# Patient Record
Sex: MALE | Race: BLACK OR AFRICAN AMERICAN | NOT HISPANIC OR LATINO | URBAN - METROPOLITAN AREA
[De-identification: names, ages, dates, MRNs, and addresses within clinical notes are randomized per-mention and may not be internally consistent; named-entity substitution may affect disease eponyms.]

---

## 2019-01-25 ENCOUNTER — APPOINTMENT (EMERGENCY)
Dept: RADIOLOGY | Facility: HOSPITAL | Age: 49
End: 2019-01-25
Payer: COMMERCIAL

## 2019-01-25 ENCOUNTER — HOSPITAL ENCOUNTER (EMERGENCY)
Facility: HOSPITAL | Age: 49
Discharge: HOME | End: 2019-01-25
Attending: EMERGENCY MEDICINE
Payer: COMMERCIAL

## 2019-01-25 VITALS
HEART RATE: 84 BPM | OXYGEN SATURATION: 99 % | SYSTOLIC BLOOD PRESSURE: 148 MMHG | DIASTOLIC BLOOD PRESSURE: 80 MMHG | WEIGHT: 290 LBS | BODY MASS INDEX: 40.6 KG/M2 | TEMPERATURE: 98.6 F | HEIGHT: 71 IN | RESPIRATION RATE: 16 BRPM

## 2019-01-25 DIAGNOSIS — J06.9 UPPER RESPIRATORY TRACT INFECTION, UNSPECIFIED TYPE: Primary | ICD-10-CM

## 2019-01-25 DIAGNOSIS — J11.1 INFLUENZA: ICD-10-CM

## 2019-01-25 LAB
ALBUMIN SERPL-MCNC: 2.8 G/DL (ref 3.4–5)
ALP SERPL-CCNC: 68 IU/L (ref 35–126)
ALT SERPL-CCNC: 22 IU/L (ref 16–63)
ANION GAP SERPL CALC-SCNC: 7 MEQ/L (ref 3–15)
AST SERPL-CCNC: 24 IU/L (ref 15–41)
BASOPHILS # BLD: 0 K/UL (ref 0.01–0.1)
BASOPHILS NFR BLD: 0 %
BILIRUB SERPL-MCNC: 0.2 MG/DL (ref 0.3–1.2)
BUN SERPL-MCNC: 14 MG/DL (ref 8–20)
CALCIUM SERPL-MCNC: 8.3 MG/DL (ref 8.9–10.3)
CHLORIDE SERPL-SCNC: 103 MEQ/L (ref 98–109)
CO2 SERPL-SCNC: 28 MEQ/L (ref 22–32)
CREAT SERPL-MCNC: 1.5 MG/DL
DIFFERENTIAL METHOD BLD: ABNORMAL
EOSINOPHIL # BLD: 0 K/UL (ref 0.04–0.54)
EOSINOPHIL NFR BLD: 0 %
ERYTHROCYTE [DISTWIDTH] IN BLOOD BY AUTOMATED COUNT: 14.4 % (ref 11.6–14.4)
FLUAV RNA SPEC QL NAA+PROBE: NEGATIVE
FLUBV RNA SPEC QL NAA+PROBE: NEGATIVE
GFR SERPL CREATININE-BSD FRML MDRD: >60 ML/MIN/1.73M*2
GLUCOSE BLD-MCNC: 149 MG/DL (ref 70–99)
GLUCOSE SERPL-MCNC: 100 MG/DL (ref 70–99)
HCT VFR BLDCO AUTO: 41.9 %
HGB BLD-MCNC: 13.3 G/DL
HYPOCHROMIA BLD QL SMEAR: ABNORMAL
LYMPHOCYTES # BLD: 1.28 K/UL (ref 1.2–3.5)
LYMPHOCYTES NFR BLD: 18 %
MCH RBC QN AUTO: 25.1 PG (ref 28–33.2)
MCHC RBC AUTO-ENTMCNC: 31.7 G/DL (ref 32.2–36.5)
MCV RBC AUTO: 79.2 FL (ref 83–98)
MONOCYTES # BLD: 1.21 K/UL (ref 0.3–1)
MONOCYTES NFR BLD: 17 %
NEUTS BAND # BLD: 0.07 K/UL
NEUTS BAND # BLD: 4.54 K/UL (ref 1.7–7)
NEUTS BAND NFR BLD: 1 %
NEUTS SEG NFR BLD: 64 %
PDW BLD AUTO: 9.6 FL (ref 9.4–12.4)
PLAT MORPH BLD: NORMAL
PLATELET # BLD AUTO: 348 K/UL
PLATELET # BLD EST: ABNORMAL 10*3/UL
POCT TEST: ABNORMAL
POTASSIUM SERPL-SCNC: 3.8 MEQ/L (ref 3.6–5.1)
PROT SERPL-MCNC: 5.8 G/DL (ref 6–8.2)
RBC # BLD AUTO: 5.29 M/UL (ref 4.5–5.8)
SODIUM SERPL-SCNC: 138 MEQ/L (ref 136–144)
WBC # BLD AUTO: 7.1 K/UL

## 2019-01-25 PROCEDURE — 87502 INFLUENZA DNA AMP PROBE: CPT | Performed by: PHYSICIAN ASSISTANT

## 2019-01-25 PROCEDURE — 80053 COMPREHEN METABOLIC PANEL: CPT | Performed by: EMERGENCY MEDICINE

## 2019-01-25 PROCEDURE — 85025 COMPLETE CBC W/AUTO DIFF WBC: CPT

## 2019-01-25 PROCEDURE — 99284 EMERGENCY DEPT VISIT MOD MDM: CPT | Mod: 25

## 2019-01-25 PROCEDURE — 63600000 HC DRUGS/DETAIL CODE: Performed by: PHYSICIAN ASSISTANT

## 2019-01-25 PROCEDURE — 71046 X-RAY EXAM CHEST 2 VIEWS: CPT

## 2019-01-25 PROCEDURE — 85025 COMPLETE CBC W/AUTO DIFF WBC: CPT | Performed by: EMERGENCY MEDICINE

## 2019-01-25 PROCEDURE — 36415 COLL VENOUS BLD VENIPUNCTURE: CPT

## 2019-01-25 PROCEDURE — 96374 THER/PROPH/DIAG INJ IV PUSH: CPT | Mod: 59

## 2019-01-25 PROCEDURE — 3E0333Z INTRODUCTION OF ANTI-INFLAMMATORY INTO PERIPHERAL VEIN, PERCUTANEOUS APPROACH: ICD-10-PCS | Performed by: EMERGENCY MEDICINE

## 2019-01-25 RX ORDER — LOSARTAN POTASSIUM AND HYDROCHLOROTHIAZIDE 25; 100 MG/1; MG/1
TABLET ORAL
COMMUNITY
Start: 2018-04-03

## 2019-01-25 RX ORDER — GABAPENTIN 600 MG/1
600 TABLET ORAL EVERY OTHER DAY
COMMUNITY
Start: 2018-03-28

## 2019-01-25 RX ORDER — ASPIRIN 81 MG/1
TABLET ORAL
COMMUNITY
Start: 2018-04-03

## 2019-01-25 RX ORDER — ALBUTEROL SULFATE 90 UG/1
INHALANT RESPIRATORY (INHALATION)
COMMUNITY
Start: 2018-03-28

## 2019-01-25 RX ORDER — SIMVASTATIN 20 MG/1
TABLET, FILM COATED ORAL
COMMUNITY
Start: 2018-04-03

## 2019-01-25 RX ORDER — PIOGLITAZONEHYDROCHLORIDE 30 MG/1
TABLET ORAL
COMMUNITY
Start: 2018-04-07

## 2019-01-25 RX ORDER — INSULIN LISPRO 100 [IU]/ML
INJECTION, SOLUTION INTRAVENOUS; SUBCUTANEOUS
COMMUNITY
Start: 2018-04-11

## 2019-01-25 RX ORDER — OSELTAMIVIR PHOSPHATE 75 MG/1
75 CAPSULE ORAL 2 TIMES DAILY
Qty: 10 CAPSULE | Refills: 0 | Status: SHIPPED | OUTPATIENT
Start: 2019-01-25 | End: 2019-01-30

## 2019-01-25 RX ORDER — ACETAMINOPHEN 500 MG
1000 TABLET ORAL EVERY 6 HOURS PRN
Qty: 50 TABLET | Refills: 0 | Status: SHIPPED | OUTPATIENT
Start: 2019-01-25

## 2019-01-25 RX ORDER — KETOROLAC TROMETHAMINE 30 MG/ML
30 INJECTION, SOLUTION INTRAMUSCULAR; INTRAVENOUS ONCE
Status: COMPLETED | OUTPATIENT
Start: 2019-01-25 | End: 2019-01-25

## 2019-01-25 RX ORDER — AMLODIPINE BESYLATE 5 MG/1
TABLET ORAL
COMMUNITY
Start: 2018-03-28

## 2019-01-25 RX ORDER — MULTIVITAMIN
TABLET ORAL
COMMUNITY
Start: 2018-04-03

## 2019-01-25 RX ORDER — IBUPROFEN 600 MG/1
600 TABLET ORAL EVERY 6 HOURS PRN
Qty: 25 TABLET | Refills: 0 | Status: SHIPPED | OUTPATIENT
Start: 2019-01-25 | End: 2019-02-04

## 2019-01-25 RX ORDER — MONTELUKAST SODIUM 10 MG/1
TABLET ORAL
COMMUNITY
Start: 2018-04-03

## 2019-01-25 RX ADMIN — KETOROLAC TROMETHAMINE 30 MG: 30 INJECTION, SOLUTION INTRAMUSCULAR at 16:44

## 2019-01-25 ASSESSMENT — ENCOUNTER SYMPTOMS
DIAPHORESIS: 1
COUGH: 1
ABDOMINAL DISTENTION: 0
CHILLS: 1
CHEST TIGHTNESS: 1
MYALGIAS: 1
SORE THROAT: 1
HEADACHES: 0
DYSURIA: 0
FEVER: 1
NAUSEA: 1
VOMITING: 0
DIARRHEA: 1
RHINORRHEA: 0

## 2019-01-25 NOTE — ED PROVIDER NOTES
HPI     Chief Complaint   Patient presents with   • Nasal Congestion     cough, congestion, asthma exacerbation for over one month       This 49 y/o M presents w/ URI symptoms x1 month. Associated with nasal congestion, productive cough, sore throat, body aches, myalgias, chest tightness, subjective fever. Pt states he took off from work at the onset of his symptoms which helped initially, but it has remained unchanged since. Pt has had some relief with dayquil, nyquil, hot tea, vicks nasal spray. Pt admits to diarrhea off and no for the past month. Pt admits to receiving flu vaccination. Pt works at a [employer] as a .         URI   Presenting symptoms: congestion, cough, fever and sore throat    Presenting symptoms: no rhinorrhea    Presenting symptoms comment:  Subjective fever.  Congestion:     Location:  Nasal and chest  Cough:     Cough characteristics: Productive with movement.    Severity:  Mild    Onset quality:  Sudden    Duration:  1 month    Timing:  Sporadic    Progression:  Unchanged    Chronicity:  New  Severity:  Moderate  Onset quality:  Gradual  Duration:  1 month  Timing:  Constant  Progression:  Unchanged  Chronicity:  New  Relieved by:  Nothing  Worsened by:  Movement  Ineffective treatments:  Decongestant, hot fluids and OTC medications  Associated symptoms: myalgias and sneezing    Associated symptoms: no headaches    Risk factors: no sick contacts    Risk factors comment:  Works at        Patient History     Past Medical History:   Diagnosis Date   • Asthma    • Hypertension    • Type 2 diabetes mellitus (CMS/HCC) (HCC)        History reviewed. No pertinent surgical history.    History reviewed. No pertinent family history.    Social History   Substance Use Topics   • Smoking status: Never Smoker   • Smokeless tobacco: Never Used   • Alcohol use No       Systems Reviewed from Nursing Triage:          Review of Systems     Review of Systems   Constitutional: Positive for  "chills, diaphoresis and fever.   HENT: Positive for congestion, sneezing and sore throat. Negative for rhinorrhea.    Respiratory: Positive for cough and chest tightness.    Cardiovascular: Negative for chest pain.   Gastrointestinal: Positive for diarrhea and nausea. Negative for abdominal distention and vomiting.   Genitourinary: Negative for dysuria.   Musculoskeletal: Positive for myalgias.   Neurological: Negative for headaches.        Physical Exam     ED Triage Vitals [01/25/19 1353]   Temp Heart Rate Resp BP SpO2   37 °C (98.6 °F) 95 17 (!) 203/100 96 %      Temp src Heart Rate Source Patient Position BP Location FiO2 (%) (Set)   -- -- -- -- --                     Patient Vitals for the past 24 hrs:   BP Temp Pulse Resp SpO2 Height Weight   01/25/19 1815 (!) 148/80 - 84 16 99 % - -   01/25/19 1552 (!) 166/78 - 80 18 99 % - -   01/25/19 1353 (!) 203/100 37 °C (98.6 °F) 95 17 96 % 1.803 m (5' 11\") 132 kg (290 lb)           Physical Exam         Procedures    ED Course & MDM     Labs Reviewed   COMPREHENSIVE METABOLIC PANEL - Abnormal        Result Value    Sodium 138      Potassium 3.8      Chloride 103      CO2 28      BUN 14      Creatinine 1.5 (*)     Glucose 100 (*)     Calcium 8.3 (*)     AST (SGOT) 24      ALT (SGPT) 22      Alkaline Phosphatase 68      Total Protein 5.8 (*)     Albumin 2.8 (*)     Bilirubin, Total 0.2 (*)     eGFR >60.0      Anion Gap 7     CBC - Abnormal     WBC 7.10      RBC 5.29      Hemoglobin 13.3 (*)     Hematocrit 41.9      MCV 79.2 (*)     MCH 25.1 (*)     MCHC 31.7 (*)     RDW 14.4      Platelets 348      MPV 9.6     DIFF COUNT - Abnormal     Differential Type Manu      Neutrophils 64      Lymphocytes 18      Monocytes 17      Eosinophils 0      Basophils 0      Bands 1      Neutrophils, Absolute 4.54      Lymphocytes, Absolute 1.28      Monocytes, Absolute 1.21 (*)     Eosinophils, Absolute 0.00 (*)     Basophils, Absolute 0.00 (*)     Bands, Absolute 0.07      Hypochromia " Occasional      Platelet Estimate Adequate (150,000-400,000)      PLT Morphology Normal     POCT GLUCOSE (BEAKER) - Abnormal     POCT Bedside Glucose 149 (*)     POC Test POC     INFLUENZA A/B NUCLEIC ACID, PCR - Normal    Influenza A Negative      Influenza B Negative     CBC AND DIFFERENTIAL    Narrative:     The following orders were created for panel order CBC and differential.  Procedure                               Abnormality         Status                     ---------                               -----------         ------                     CBC[[id number]]                            Abnormal            Final result               Diff Count[[id number]]                     Abnormal            Final result                 Please view results for these tests on the individual orders.       X-RAY CHEST 2 VIEWS   Final Result   IMPRESSION:  No active pulmonary disease identified.      I certify that I have reviewed this examination and agree with this report.   Adrian Solano MD                  MDM         Clinical Impressions as of Jan 25 1849   Upper respiratory tract infection, unspecified type   Influenza - probable        Quincy Lima PA C  01/25/19 1849

## 2019-01-26 NOTE — ED ATTESTATION NOTE
The patient was evaluated and managed by the physician assistant / nurse practitioner.      Case discussed w/PA (DB). He has suspicion acute influenza. Pt w/out systemic illness/toxicity per PA. ~30+ pts in [facility] (including critically ill patients waiting for a room) and unfortunately it is critically important to dc patients asap if safe---which this pt meets criteria for. Will give pt rx for tamiflu and dc home w/instruct pt to call back in a couple hrs for flu PCR result. If neg, pt instructed NOT to take tamiflu.      Anselmo Singh MD  01/26/19 1422

## 2024-09-16 ENCOUNTER — OFFICE VISIT (OUTPATIENT)
Dept: NEUROLOGY | Facility: CLINIC | Age: 54
End: 2024-09-16
Payer: MEDICARE

## 2024-09-16 VITALS
BODY MASS INDEX: 35.28 KG/M2 | OXYGEN SATURATION: 98 % | DIASTOLIC BLOOD PRESSURE: 84 MMHG | WEIGHT: 252 LBS | RESPIRATION RATE: 16 BRPM | SYSTOLIC BLOOD PRESSURE: 130 MMHG | HEIGHT: 71 IN | HEART RATE: 69 BPM

## 2024-09-16 DIAGNOSIS — I63.50 RIGHT PONTINE CEREBROVASCULAR ACCIDENT (CMS/HCC): Primary | ICD-10-CM

## 2024-09-16 PROCEDURE — 99205 OFFICE O/P NEW HI 60 MIN: CPT | Performed by: PSYCHIATRY & NEUROLOGY

## 2024-09-16 RX ORDER — LOSARTAN POTASSIUM 100 MG/1
100 TABLET ORAL DAILY
COMMUNITY
Start: 2024-06-24 | End: 2025-06-24

## 2024-09-16 RX ORDER — CETIRIZINE HYDROCHLORIDE 10 MG/1
1 TABLET ORAL DAILY
COMMUNITY
Start: 2024-02-26

## 2024-09-16 RX ORDER — DULAGLUTIDE 3 MG/.5ML
3 INJECTION, SOLUTION SUBCUTANEOUS WEEKLY
COMMUNITY
Start: 2024-03-13

## 2024-09-16 RX ORDER — TORSEMIDE 100 MG/1
100 TABLET ORAL 2 TIMES DAILY
COMMUNITY
Start: 2024-06-24

## 2024-09-16 RX ORDER — CLOPIDOGREL BISULFATE 75 MG/1
75 TABLET ORAL DAILY
COMMUNITY
Start: 2024-06-24

## 2024-09-16 RX ORDER — ESCITALOPRAM OXALATE 10 MG/1
10 TABLET ORAL DAILY
COMMUNITY
Start: 2024-09-03

## 2024-09-16 RX ORDER — EPINEPHRINE 0.3 MG/.3ML
INJECTION SUBCUTANEOUS
COMMUNITY
Start: 2023-12-18

## 2024-09-16 RX ORDER — SEVELAMER CARBONATE 800 MG/1
800 TABLET, FILM COATED ORAL
COMMUNITY

## 2024-09-16 RX ORDER — ONDANSETRON 4 MG/1
1 TABLET, ORALLY DISINTEGRATING ORAL EVERY 8 HOURS PRN
COMMUNITY

## 2024-09-16 RX ORDER — CARVEDILOL 25 MG/1
25 TABLET ORAL EVERY 12 HOURS
COMMUNITY
Start: 2024-09-03

## 2024-09-16 RX ORDER — FLUTICASONE PROPIONATE 110 UG/1
2 AEROSOL, METERED RESPIRATORY (INHALATION) 2 TIMES DAILY
COMMUNITY

## 2024-09-16 RX ORDER — DOCUSATE SODIUM 100 MG/1
100 CAPSULE, LIQUID FILLED ORAL 2 TIMES DAILY
COMMUNITY

## 2024-09-16 RX ORDER — PARICALCITOL 2 UG/1
2 CAPSULE, LIQUID FILLED ORAL DAILY
COMMUNITY
Start: 2024-06-24

## 2024-09-16 NOTE — PROGRESS NOTES
Patient ID: Devaughn Calle                              : 1970  MRN: [id number]                                            VISIT DATE: 2024    PRIMARY CARE PROVIDER: Brianna Skinner DO     CONSULTING PHYSICIAN: Mark Lyles DO    CHIEF COMPLAINT: Stroke      HISTORY OF PRESENT ILLNESS:  Dear Doctor,    I had the pleasure of seeing your patient Devaughn Calle at the neurology clinic for a consultation.    As you know, Mr. Devaughn Calle is a 54 y.o. left handed male with a history of right alfonso stroke (2024), hypertension, type 2 diabetes mellitus, ESRD on PD and asthma.    Other medical specialists:  Patient Care Team     Relationship Specialty Notifications Start End   Amalia Arenas PCP - General   6/10/24    Mark Lyles DO Consulting Physician Neurology Admissions 24     Address:  [address] [place] [address] [address] [address]   Dinah Alford MD      Initial HPI 2024:  Mr. Devaughn Calle is accompanied by his wife.    He is here for evaluation of stroke.    Woke up from sleep  with slurred, speech, gait instability, left facial drop and mild left hemiparesis.  Was listing to the right with walking.  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  Was getting ready for work.  Was talking to his cousin on phone, was told he was slurring his speech.  States BP was elevated.  Told wife he wasn't feeling well.  Then slept all night.  Eventually went to hospitals ER on .  VS included /85, HR 81.  CT/MRI brain with right alfonso acute/subacute ischemic infarction.  Labs included LDL 60, HbA1c 8.3%.  CTA head/neck negative for LVO.  Was advised to start Aspirin 81 mg and Atorvastatin 80 mg QHS.    Returned to ER on  for management of hypertension after home /107.  BP medications were adjusted.    Returned to ER on  after having gait ataxia and veering to the right.  Family noted BP was a little elevated that morning.  In ER, VS included /85, HR 70.    Feels better.  Can sometimes  stumble with walking.  Can veer to the left. Walks on his power.  Denies dysphagia, dysarthria, hemianopsia, chest pain nor palpitations.  Wife notices he takes longer to process information and respond.  Has poor concentration.  Has insomnia.  Has MAGALI, but doesn't have a CPAP.  Independent for ADL's including eating, bathing, putting on clothing and administering medications.  Denies anosmia nor hypogeusia.  Denies sleep walking nor REM sleep behavior disorder.  Denies AH/VH.  Denies epileptic seizures, staring spells nor loss of consciousness episodes.   Denies incontinence.  Drives without difficulty and obeys all traffic laws.  He is not a vegetarian.  Denies room spinning sensation.    Had undergone PT but stopped.    Quit smoking after 2020 when got kidney disease.  Previously smoked cigars.    Denies history of cancer nor MI.    Had heart monitor 1 week, negative.     I reviewed his available outside medical records personally.  Results of relevant studies were summarized later in this note.    MEDICATIONS:   Current Outpatient Medications:     acetaminophen (TYLENOL EXTRA STRENGTH) 500 mg tablet, Take 2 tablets (1,000 mg total) by mouth every 6 (six) hours as needed for mild pain., Disp: 50 tablet, Rfl: 0    albuterol HFA (VENTOLIN HFA) 90 mcg/actuation inhaler, , Disp: , Rfl:     albuterol sulfate 90 mcg/actuation inhaler, Inhale., Disp: , Rfl:     aspirin 81 mg enteric coated tablet, , Disp: , Rfl:     carvediloL (COREG) 25 mg tablet, Take 25 mg by mouth every 12 (twelve) hours., Disp: , Rfl:     cetirizine (ZyrTEC) 10 mg tablet, Take 1 tablet by mouth daily., Disp: , Rfl:     clopidogreL (PLAVIX) 75 mg tablet, Take 75 mg by mouth daily., Disp: , Rfl:     docusate sodium (COLACE) 100 mg capsule, Take 100 mg by mouth 2 (two) times a day., Disp: , Rfl:     EPINEPHrine (EPIPEN) 0.3 mg/0.3 mL injection syringe, INJECT 0.3 ML INTO THE MUSCLE ONE TIME FOR 1 DOSE., Disp: , Rfl:     escitalopram (LEXAPRO) 10 mg  tablet, Take 10 mg by mouth daily., Disp: , Rfl:     fluticasone HFA (FLOVENT HFA) 110 mcg/actuation inhaler, Inhale 2 puffs 2 (two) times a day., Disp: , Rfl:     gabapentin (NEURONTIN) 600 mg tablet, Take 600 mg by mouth every other day., Disp: , Rfl:     losartan (COZAAR) 100 mg tablet, Take 100 mg by mouth daily., Disp: , Rfl:     montelukast (SINGULAIR) 10 mg tablet, , Disp: , Rfl:     ondansetron ODT (ZOFRAN-ODT) 4 mg disintegrating tablet, Take 1 tablet by mouth every 8 (eight) hours as needed., Disp: , Rfl:     paricalcitoL (ZEMPLAR) 2 mcg capsule, Take 2 mcg by mouth daily., Disp: , Rfl:     sevelamer (RENVELA) 800 mg tablet, Take 800 mg by mouth 3 (three) times a day with meals., Disp: , Rfl:     simvastatin (ZOCOR) 20 mg tablet, , Disp: , Rfl:     torsemide (DEMADEX) 100 mg tablet, Take 100 mg by mouth 2 times daily., Disp: , Rfl:     TRULICITY 3 mg/0.5 mL pen injector, Inject 3 mg under the skin once a week., Disp: , Rfl:     amLODIPine (NORVASC) 5 mg tablet, , Disp: , Rfl:     ibuprofen (MOTRIN) 600 mg tablet, Take 1 tablet (600 mg total) by mouth every 6 (six) hours as needed for mild pain for up to 10 days., Disp: 25 tablet, Rfl: 0    insulin lispro (HumaLOG KwikPen Insulin) 100 unit/mL subcutaneous pen, , Disp: , Rfl:     losartan-hydrochlorothiazide (HYZAAR) 100-25 mg per tablet, , Disp: , Rfl:     multivitamin (THERAGRAN) tablet, , Disp: , Rfl:     pioglitazone (ACTOS) 30 mg tablet, , Disp: , Rfl:     PAST MEDICAL HISTORY:  has a past medical history of Asthma, Hypertension, and Type 2 diabetes mellitus (CMS/HCC).    PAST SURGICAL HISTORY:  has no past surgical history on file.    SOCIAL HISTORY:   Social History     Tobacco Use    Smoking status: Never    Smokeless tobacco: Never   Substance Use Topics    Alcohol use: No    Drug use: No   He is .    FAMILY HISTORY: family history is not on file.    ALLERGIES: is allergic to coconut, red dye, and shellfish derived.     REVIEW OF SYSTEMS:  "  All other systems reviewed and negative except as noted in the HPI.    PHYSICAL EXAM:  Visit Vitals  /84 (BP Location: Left upper arm, Patient Position: Sitting)   Pulse 69   Resp 16   Ht 1.803 m (5' 11\")   Wt 114 kg (252 lb)   SpO2 98%   BMI 35.15 kg/m²     GENERAL APPEARANCE: Well appearing, obese nourished and normally developed male.  Not in acute distress.  Appears stated age.  HEAD: Normocephalic, atraumatic.    EYES:  Sclerae white. Conjunctivae clear.  OD exophoria.  FUNDOSCOPIC: Flat optic discs. No papilledema. No retinal hemorrhages.  NECK:  No bruits auscultated over the carotid regions. Neck was supple.  CARDIOVASCULAR:  Regular rate and rhythm. S1, S2 auscultated. No murmurs, rubs or gallops.  EXTREMITIES: No clubbing, no cyanosis nor edema.  MUSCULOSKELETAL: Normal muscle bulk and tone.  No spasticity.  No resting tremor nor cogwheel rigidity.  No asterixis.    SKIN:  Dry, intact. No rashes noted. Warm to touch.  PSYCHIATRIC: Calm and cooperative with appropriate insight.    NEUROLOGICAL EXAM:  MENTAL STATUS: MMSE score 28/30.  Alert, awake and oriented x 8/10.  3/3 delayed recall without clues (giovanny, truck, apple).  5/5 serial  7's.   Intact naming.  Intact repetition.  Followed 3/3 step commands.  Followed a written command.  Wrote a complete sentence.  Was able to copy intersecting pentagons design.  Spontaneous fluent speech output.  CRANIAL NERVES:  Grossly full visual fields. Pupils are reactive to light.  Pupils are equal and round.  Extraocular movements are intact. No nystagmus. Symmetric smile. Uvula and tongue is midline. No dysarthria. No dysphonia.  MOTOR STRENGTH:   Slight left arm drift.    Biceps Triceps Deltoid Wrist ext Opp  pollicis Finger Spread Hip flexion Knee ext Knee   flexion Dorsi- flexion Plantar flexion   R 5 5 5 5 5 5 5 5 5 5 5   L 5 5 5 5 5 5 5 5 5 5 5     REFLEXES:    RUE: 2+ biceps, 2+ brachioradialis, 2+ triceps, Negative Savage's sign.    LUE: 3+ biceps, 3+ " "brachioradialis, 3+ triceps, Negative Savage's sign.    RLE:1+ patellar, 0+ Achilles DTR  LLE: 2+ patellar, 0+ Achilles DTR  SENSATION: Hands and ankles were intact to vibration sensation.  COORDINATION/GAIT:  Slight dysmetria of LUE on Finger-to-nose-to-finger, no dysmetria of RUE.  Has trace dysdiadochokinesia of left foot tapping, but not right foot.  No dysdiadochokinesia to finger tapping of either hand.  Intact station and gait. Has a normal arm swing and stride length. Stood up from seated position with arms crossed on first attempt.  Stood independently at least 10 seconds on Romberg's test but did waver.  Listed to left more than right on tandem gait.        LABORATORY STUDIES:  2/16/2024: HbA1c 8.3%  6/13/2024: RPR reactive, T pallidum reactive, HbA1c 7.3%, HIV nonreactive, LDL 72  6/20/2024: T pallidum antibody nonreactive  Lab Results   Component Value Date    HGBA1C 7.3 (H) 06/13/2024        No results found for: \"LAMOTRIGINE\", \"CBMZ\", \"OXCARB\", \"LEVETIRACETA\", \"PHENYTOIN\", \"VPA\", \"LACOSAMIDE\", \"TOIRAMATE\", \"TOPRIM\", \"ZONEG\", \"PHENOBARB\"     No results found for: \"SUFW848\", \"INR\"     CBC Results         01/25/19 01/21/16     1557 1918    WBC 7.10 5.23    RBC 5.29 5.64    HGB 13.3 14.3    HCT 41.9 44.4    MCV 79.2 78.7    MCH 25.1 25.4    MCHC 31.7 32.2     291          BMP Results         01/25/19 01/21/16     1557 1918     139    K 3.8 3.7    Cl 103 106    CO2 28 26    Glucose 100 167    BUN 14 6    Creatinine 1.5 1.0    Calcium 8.3 8.4    Anion Gap 7 7    EGFR >60.0 --           Comment for K at 1557 on 01/25/19:   Results obtained on plasma. Plasma Potassium values may be up to 0.4 mEQ/L less than serum values. The differences may be greater for patients with high platelet or white cell counts.    Comment for K at 1918 on 01/21/16: RESULTS OBTAINED ON PLASMA. PLASMA POTASSIUM VALUES MAY BE UP TO 0.4 MEQ/L LESS THAN SERUM VALUES. THE DIFFERENCES MAY BE GREATER FOR PATIENTS WITH HIGH " PLATELET OR WHITE CELL COUNTS.          PT/PTT Results    No lab values to display.       UA Results    No lab values to display.       Lactate Results    No lab values to display.         Labs were reviewed by me personally.      IMAGING STUDIES:     CT head noncontrast 2/16/2024:    CLINICAL INFORMATION: 53 years old  presents complaining of AMS over the past 2 days, fatigue, increasing somnolence, confusion, and slower gait.    Impression  9 mm focal area of hypodensity within the paramedian right alfonso which may represent acute/subacute ischemia versus other etiologies. A MRI with diffusion-weighted imaging is recommended for further evaluation. No intracranial hemorrhage.    MRI brain noncontrast 2/16/2024:  HISTORY: Stroke, follow up. Neuro deficit, acute, stroke suspected    FINDINGS:    There is restricted diffusion in the right alfonso consistent with an acute to subacute infarct.    B0 images show no evidence of acute hemorrhage. No midline shift. No hydrocephalus.  Procedure Note    Lamin Burrell MD - 02/16/2024  Formatting of this note might be different from the original.  HISTORY: Stroke, follow up. Neuro deficit, acute, stroke suspected    TECHNIQUE: MR HEAD RAPID STROKE WO IV CONTRAST    Only DWI/ADC images are acquired due to patient's claustrophobia.    COMPARISON: MR Brain 6/16/2018.    FINDINGS:    There is restricted diffusion in the right alfonso consistent with an acute to subacute infarct.    B0 images show no evidence of acute hemorrhage. No midline shift. No hydrocephalus.    IMPRESSION:  Right alfonso consistent with an acute to subacute infarct.    CTA head/neck with/without contrast 2/16/2024:    1. Evolving infarct right alfonso.     2. No evidence of large vessel occlusion or hemodynamically significant stenosis in the intracranial or extracranial vasculature.     CTA head/neck with/without contrast 6/5/2024:  HISTORY: 53 years old. Presenting with gait dysfunction     IMPRESSON:  1. No CT evidence of  acute infarct. No acute intracranial hemorrhage. Chronic right pontine infarct. Please note that brain MRI is more sensitive for detection of acute ischemia and can be performed if clinically warranted.   2. No large vessel occlusion or hemodynamically significant stenosis in the intracranial and extracranial vasculature.   3. Unremarkable CT perfusion study of the brain.     MRI brain noncontrast 6/5/2024:  No acute infarction or hemorrhage.         NEURODIAGNOSTIC STUDIES:      CARDIOVASCULAR STUDIES:    Transesophageal echocardiogram 3/27/2024:  A complete transesophageal echocardiogram (including 2D, color flow   Doppler and spectral Doppler) was performed using standard protocol.   The left ventricle is normal in size. There is mildly increased left   ventricular wall thickness consistent with mild concentric hypertrophy.   There are no segmental wall motion abnormalities. Normal left ventricular   ejection fraction. The left ventricular ejection fraction by visual   estimate is 55%.   LV diastolic function parameters were not assessed.   The right ventricle is normal in size. There is normal function of the   right ventricle.   The left atrium is normal in size. No thrombus present in the left   atrial appendage. The left atrial appendage is dilated. The left atrial   appendage has normal Doppler velocity.   The right atrium is normal in size.   There is trivial mitral valve leaflet thickening. There is mild mitral   annular calcification. There is normal mitral valve leaflet mobility.   There is trace mitral regurgitation.   The aortic valve is trileaflet. There is trivial aortic valve   thickening. There is normal excursion of the aortic valve.  There is no   aortic stenosis. There is trace aortic regurgitation.   The tricuspid valve is normal in structure. There is normal tricuspid   leaflet mobility. There is trace tricuspid regurgitation. The pulmonary   artery systolic pressure is unable to be estimated  due to an incomplete   tricuspid regurgitation envelope.   The pulmonic valve is normal in structure.   The aortic arch is atherosclerotic. The descending aorta is   atherosclerotic. Plaque present in the aortic arch and descending aorta.   Grade I plaque (normal to mild intimal thickening) present in the aortic   arch. Grade II plaque (severe intimal thickening without protruding   atheroma) present in the descending aorta.   Agitated saline contrast injection demonstrates no early passage of   bubbles at rest. With Valsalva maneuver, there is no passage of bubbles   across the interatrial septum. There was no late passage of contrast.   There are no agitated saline contrast bubbles visualized in the left   atrium or the left ventricle (grade 0).   Indeterminate right atrial pressure.  The right atrial pressure is   assumed to be 8 mmHg.   There is no prior study available for comparison at this organization.   There is a TTE from 2/16/2024, limiting direct comparison to today's WILLIAN.   LV with mild concentric hypertrophy and normal systolic function (LVEF   55%). Normal RV size and function. No significant valvular dysfunction or   vegetation noted. No LA or AYESHA thrombus. No interatrial communication   demonstrated by agitated saline contrast injection.       ASSESSMENT:  A 54 y.o. male who had a right alfonso stroke.    Suspect stroke mechanism is lacunar infarction from uncontrolled small vessel disease risk factors.    Differential diagnosis includes central embolic stroke, atheroembolic stroke.      RECOMMENDATIONS:    Right alfonso stroke  - Advise daily antiplatelet agent as tolerated.  He is on DAPT with Aspirin and Clopidogrel.  He only needs to be on a single antiplatelet agent such as aspirin 81 mg/day for stroke risk reduction purposes.  Will defer to his cardiologist regarding long-term management of his antiplatelet agent.  - Advise daily lipid lowering agent/statin as tolerated. He is taking Simvastatin.   LDL goal < 70.  - Close monitoring and aggressive management of hypertension.  SBP goal < 130 mmHg.  - Close monitoring and aggressive management of diabetes mellitus.  HbA1c goal < 7%.  - Surveillance for evidence of pathological cardiac arrhythmia such as paroxysmal atrial fibrillation.  - The pathophysiology and prognosis of stroke was discussed at length with the patient.   - Stroke precautions should continue to be followed.  The patient is aware that he should immediately call 911 and go to the emergency room should he develop any new stroke symptoms.  Stroke symptoms could include, but are not limited to facial droop, speech disturbance and/or limb weakness.       I have asked the patient to follow-up in the neurology clinic in 6-12 months or earlier if needed.    Thank you for allowing me to participate in the care of your patient.  Please feel free to contact me at any time if you have any further questions.        I spent  60 minutes on this date of service performing the following activities: obtaining history, performing examination, entering orders, documenting, preparing for visit, obtaining / reviewing records, providing counseling and education, communicating results, and coordinating care.  CPT 77423 based upon medical decision making and complexity of care.  I attest that this visit supports the complexity inherent to evaluation and management associated with medical care services that serve as the continuing focal point for all needed health care services and/or medical care services that are part of ongoing care related to this patient's single, serious condition or a complex condition.       Mark Lyles DO  Neurologist  [facility]

## 2024-09-19 PROBLEM — I63.50: Status: ACTIVE | Noted: 2024-09-19

## 2024-11-13 ENCOUNTER — DOCTOR'S OFFICE (OUTPATIENT)
Facility: LOCATION | Age: 54
Setting detail: OPHTHALMOLOGY
End: 2024-11-13
Payer: MEDICARE

## 2024-11-13 DIAGNOSIS — H26.492: ICD-10-CM

## 2024-11-13 DIAGNOSIS — E11.3212: ICD-10-CM

## 2024-11-13 DIAGNOSIS — H40.023: ICD-10-CM

## 2024-11-13 PROBLEM — Z96.1 PRESENCE OF INTRAOCULAR LENS ; BOTH EYES: Status: ACTIVE | Noted: 2024-11-13

## 2024-11-13 PROCEDURE — 92133 CPTRZD OPH DX IMG PST SGM ON: CPT

## 2024-11-13 PROCEDURE — 99213 OFFICE O/P EST LOW 20 MIN: CPT

## 2024-11-13 PROCEDURE — 92134 CPTRZ OPH DX IMG PST SGM RTA: CPT

## 2024-11-14 ASSESSMENT — VISUAL ACUITY
OS_BCVA: 20/20-
OD_BCVA: 20/40

## 2025-01-14 ENCOUNTER — DOCTOR'S OFFICE (OUTPATIENT)
Facility: LOCATION | Age: 55
Setting detail: OPHTHALMOLOGY
End: 2025-01-14
Payer: MEDICARE

## 2025-01-14 DIAGNOSIS — E11.3212: ICD-10-CM

## 2025-01-14 DIAGNOSIS — H26.492: ICD-10-CM

## 2025-01-14 DIAGNOSIS — H40.1132: ICD-10-CM

## 2025-01-14 PROCEDURE — 92083 EXTENDED VISUAL FIELD XM: CPT

## 2025-01-14 PROCEDURE — 99213 OFFICE O/P EST LOW 20 MIN: CPT

## 2025-01-14 ASSESSMENT — VISUAL ACUITY
OS_BCVA: 20/20-2
OD_BCVA: 20/40

## 2025-03-11 ENCOUNTER — DOCTOR'S OFFICE (OUTPATIENT)
Facility: LOCATION | Age: 55
Setting detail: OPHTHALMOLOGY
End: 2025-03-11
Payer: MEDICARE

## 2025-03-11 DIAGNOSIS — E11.3212: ICD-10-CM

## 2025-03-11 DIAGNOSIS — H40.1132: ICD-10-CM

## 2025-03-11 DIAGNOSIS — H26.492: ICD-10-CM

## 2025-03-11 PROCEDURE — 99213 OFFICE O/P EST LOW 20 MIN: CPT

## 2025-03-11 ASSESSMENT — VISUAL ACUITY
OD_BCVA: 20/40
OS_BCVA: 20/20

## 2025-04-22 ENCOUNTER — RX ONLY (RX ONLY)
Age: 55
End: 2025-04-22

## 2025-04-22 ENCOUNTER — DOCTOR'S OFFICE (OUTPATIENT)
Facility: LOCATION | Age: 55
Setting detail: OPHTHALMOLOGY
End: 2025-04-22
Payer: MEDICARE

## 2025-04-22 DIAGNOSIS — H04.123: ICD-10-CM

## 2025-04-22 DIAGNOSIS — H40.1132: ICD-10-CM

## 2025-04-22 DIAGNOSIS — H26.492: ICD-10-CM

## 2025-04-22 DIAGNOSIS — Z96.1: ICD-10-CM

## 2025-04-22 DIAGNOSIS — H40.1122: ICD-10-CM

## 2025-04-22 PROCEDURE — 65855 TRABECULOPLASTY LASER SURG: CPT | Mod: LT | Performed by: OPHTHALMOLOGY

## 2025-04-22 PROCEDURE — 76514 ECHO EXAM OF EYE THICKNESS: CPT | Performed by: OPHTHALMOLOGY

## 2025-04-22 PROCEDURE — 99213 OFFICE O/P EST LOW 20 MIN: CPT | Mod: 25 | Performed by: OPHTHALMOLOGY

## 2025-04-22 ASSESSMENT — TEAR BREAK UP TIME (TBUT)
OS_TBUT: 1+
OD_TBUT: 1+

## 2025-04-22 ASSESSMENT — CONFRONTATIONAL VISUAL FIELD TEST (CVF)
OS_FINDINGS: FULL
OD_FINDINGS: FULL

## 2025-04-22 ASSESSMENT — VISUAL ACUITY
OD_BCVA: 20/30-2
OS_BCVA: 20/20-2